# Patient Record
Sex: MALE | Race: WHITE | HISPANIC OR LATINO | URBAN - METROPOLITAN AREA
[De-identification: names, ages, dates, MRNs, and addresses within clinical notes are randomized per-mention and may not be internally consistent; named-entity substitution may affect disease eponyms.]

---

## 2018-01-08 ENCOUNTER — INPATIENT (INPATIENT)
Facility: HOSPITAL | Age: 73
LOS: 0 days | Discharge: HOME | DRG: 563 | End: 2018-01-09
Attending: HOSPITALIST | Admitting: HOSPITALIST
Payer: MEDICARE

## 2018-01-08 VITALS
HEIGHT: 69 IN | OXYGEN SATURATION: 96 % | TEMPERATURE: 98 F | HEART RATE: 84 BPM | WEIGHT: 199.96 LBS | RESPIRATION RATE: 20 BRPM | SYSTOLIC BLOOD PRESSURE: 152 MMHG | DIASTOLIC BLOOD PRESSURE: 87 MMHG

## 2018-01-08 DIAGNOSIS — S82.899A OTHER FRACTURE OF UNSPECIFIED LOWER LEG, INITIAL ENCOUNTER FOR CLOSED FRACTURE: ICD-10-CM

## 2018-01-08 LAB
ALBUMIN SERPL ELPH-MCNC: 4.1 G/DL — SIGNIFICANT CHANGE UP (ref 3.5–5)
ALP SERPL-CCNC: 60 U/L — SIGNIFICANT CHANGE UP (ref 40–120)
ALT FLD-CCNC: 72 U/L DA — HIGH (ref 10–60)
ANION GAP SERPL CALC-SCNC: 8 MMOL/L — SIGNIFICANT CHANGE UP (ref 5–17)
APTT BLD: 29.1 SEC — SIGNIFICANT CHANGE UP (ref 27.5–37.4)
AST SERPL-CCNC: 63 U/L — HIGH (ref 10–40)
BILIRUB SERPL-MCNC: 0.9 MG/DL — SIGNIFICANT CHANGE UP (ref 0.2–1.2)
BLD GP AB SCN SERPL QL: SIGNIFICANT CHANGE UP
BUN SERPL-MCNC: 13 MG/DL — SIGNIFICANT CHANGE UP (ref 7–18)
CALCIUM SERPL-MCNC: 9.1 MG/DL — SIGNIFICANT CHANGE UP (ref 8.4–10.5)
CHLORIDE SERPL-SCNC: 107 MMOL/L — SIGNIFICANT CHANGE UP (ref 96–108)
CO2 SERPL-SCNC: 29 MMOL/L — SIGNIFICANT CHANGE UP (ref 22–31)
CREAT SERPL-MCNC: 0.88 MG/DL — SIGNIFICANT CHANGE UP (ref 0.5–1.3)
GLUCOSE SERPL-MCNC: 103 MG/DL — HIGH (ref 70–99)
HCT VFR BLD CALC: 47 % — SIGNIFICANT CHANGE UP (ref 39–50)
HGB BLD-MCNC: 14.8 G/DL — SIGNIFICANT CHANGE UP (ref 13–17)
INR BLD: 1.13 RATIO — SIGNIFICANT CHANGE UP (ref 0.88–1.16)
MCHC RBC-ENTMCNC: 28.4 PG — SIGNIFICANT CHANGE UP (ref 27–34)
MCHC RBC-ENTMCNC: 31.5 GM/DL — LOW (ref 32–36)
MCV RBC AUTO: 90.2 FL — SIGNIFICANT CHANGE UP (ref 80–100)
PLATELET # BLD AUTO: 264 K/UL — SIGNIFICANT CHANGE UP (ref 150–400)
POTASSIUM SERPL-MCNC: 4.1 MMOL/L — SIGNIFICANT CHANGE UP (ref 3.5–5.3)
POTASSIUM SERPL-SCNC: 4.1 MMOL/L — SIGNIFICANT CHANGE UP (ref 3.5–5.3)
PROT SERPL-MCNC: 7.8 G/DL — SIGNIFICANT CHANGE UP (ref 6–8.3)
PROTHROM AB SERPL-ACNC: 12.4 SEC — SIGNIFICANT CHANGE UP (ref 9.8–12.7)
RBC # BLD: 5.21 M/UL — SIGNIFICANT CHANGE UP (ref 4.2–5.8)
RBC # FLD: 12.9 % — SIGNIFICANT CHANGE UP (ref 10.3–14.5)
SODIUM SERPL-SCNC: 144 MMOL/L — SIGNIFICANT CHANGE UP (ref 135–145)
WBC # BLD: 9.7 K/UL — SIGNIFICANT CHANGE UP (ref 3.8–10.5)
WBC # FLD AUTO: 9.7 K/UL — SIGNIFICANT CHANGE UP (ref 3.8–10.5)

## 2018-01-08 PROCEDURE — 99285 EMERGENCY DEPT VISIT HI MDM: CPT

## 2018-01-08 PROCEDURE — 73610 X-RAY EXAM OF ANKLE: CPT | Mod: 26,LT

## 2018-01-08 PROCEDURE — 73630 X-RAY EXAM OF FOOT: CPT | Mod: 26,LT

## 2018-01-08 PROCEDURE — 71045 X-RAY EXAM CHEST 1 VIEW: CPT | Mod: 26

## 2018-01-08 NOTE — ED PROVIDER NOTE - CHPI ED SYMPTOMS NEG
no fever, no chills, no shortness of breath, no cough, no chest pain, no palpitations, no nausea, no vomiting, no diarrhea, no abd pain, no dysuria, no urinary frequency, no hematuria, no urinary/bowel incontinence, no numbness, no tingling, no weakness, no LOC

## 2018-01-08 NOTE — ED PROVIDER NOTE - OBJECTIVE STATEMENT
73 y/o M pt with PMHx of HTN, presents to ED c/o left ankle pain s/p mechanical trip and fall 3 days ago while walking on [employer] [place]. Pt states she had x-ray done at [facility] which showed a fracture. Pt denies fever, chills, shortness of breath, cough, chest pain, palpitations, nausea, vomiting, diarrhea, abd pain, dysuria, urinary frequency, hematuria, numbness, tingling, weakness, LOC, head trauma, or any other complaints. NKDA.

## 2018-01-08 NOTE — CONSULT NOTE ADULT - SUBJECTIVE AND OBJECTIVE BOX
CC: Left ankle pain s/p fall 3 days ago.      HPI: 72y year old male presents to ED for treatment of his left ankle s/p fall 3 days ago. Pt states he previously went to a PMD who had taken X-rays earlier today and noted a fracture. Patient was unable to get a cast or crutches there and came to ED to seek further treatment. Patient states he is currently not in any pain, and denies taking any medication for the pain since his fall.   Patient did not elevate, ice, nor wrap his ankle since the fall. He also admits he has had great difficulty ambulating. Patient admits to  (-) Fevers, (-) Chills, (-) Nausea, (-) Vomiting, (-) Shortness of Breath    PMH: Hypothyroidism  PSH: None  Allergies: NKDA  Social: Past smoker for 15 years (smoked 1 pack of cigarettes/day), denies use of alcohol nor illicit drug use.      Labs:        WBC Trend      Chem            T(F): 98.3 (01-08-18 @ 16:11), Max: 98.3 (01-08-18 @ 16:11)  HR: 84 (01-08-18 @ 16:11) (84 - 84)  BP: 152/87 (01-08-18 @ 16:11) (152/87 - 152/87)  RR: 20 (01-08-18 @ 16:11) (20 - 20)  SpO2: 96% (01-08-18 @ 16:11) (96% - 96%)  Wt(kg): --    O:   Vascular: Dorsalis Pedis and Posterior Tibial pulses 2/4. Capillary refill time less than 3 seconds digits 1-5 bilateral.   Neuro: Protective sensation intact to the level of the digits bilateral.  Integument: Skin warm, dry and supple bilateral. No open lesions or interdigital macerations noted bilateral. No hyperkeratotic lesions noted  Area of CC: Pain on palpation at the left lateral malleoli, ATFL and CFL region, edema to the left ankle, and ecchymosis noted lateral foot. No open lesions noted. unable to tolerate ROM.           A:  Closed displaced fracture of the Left fibula      P: Chart reviewed and patient evaluated  Reviewed X-rays: shows displaced fibular fracture, final reading pending.  Applied Altamirano Compression to the area of the fracture.  Applied Posterior splint to Left lower extremity.   Patient advised to be Non-weightbearing to the Left at all times.  Patient to be taken to the OR for ORIF of left ankle tomorrow (1/9/18).  Please medically clear patient for surgery.  NPO placed for midnight.  Discussed with attending. Dr. De La Paz

## 2018-01-08 NOTE — ED PROVIDER NOTE - PROGRESS NOTE DETAILS
Pt ACP pt, admission to hospitalist service indicated. Pt already seen by podiatry, recommend admission for med clearance and surgery tomorrow. D/w hospitalist Dr. Estevez who is prefers orthopedic service eval, d/w Dr. Hammond who will eval pt for surgery tomorrow.

## 2018-01-09 ENCOUNTER — TRANSCRIPTION ENCOUNTER (OUTPATIENT)
Age: 73
End: 2018-01-09

## 2018-01-09 VITALS
RESPIRATION RATE: 18 BRPM | OXYGEN SATURATION: 95 % | TEMPERATURE: 98 F | SYSTOLIC BLOOD PRESSURE: 156 MMHG | HEART RATE: 76 BPM | DIASTOLIC BLOOD PRESSURE: 88 MMHG

## 2018-01-09 DIAGNOSIS — E03.9 HYPOTHYROIDISM, UNSPECIFIED: ICD-10-CM

## 2018-01-09 DIAGNOSIS — S82.899A OTHER FRACTURE OF UNSPECIFIED LOWER LEG, INITIAL ENCOUNTER FOR CLOSED FRACTURE: ICD-10-CM

## 2018-01-09 DIAGNOSIS — Z29.9 ENCOUNTER FOR PROPHYLACTIC MEASURES, UNSPECIFIED: ICD-10-CM

## 2018-01-09 DIAGNOSIS — E78.5 HYPERLIPIDEMIA, UNSPECIFIED: ICD-10-CM

## 2018-01-09 DIAGNOSIS — I10 ESSENTIAL (PRIMARY) HYPERTENSION: ICD-10-CM

## 2018-01-09 PROCEDURE — G0378: CPT

## 2018-01-09 PROCEDURE — 80053 COMPREHEN METABOLIC PANEL: CPT

## 2018-01-09 PROCEDURE — 71045 X-RAY EXAM CHEST 1 VIEW: CPT

## 2018-01-09 PROCEDURE — 73630 X-RAY EXAM OF FOOT: CPT

## 2018-01-09 PROCEDURE — 85027 COMPLETE CBC AUTOMATED: CPT

## 2018-01-09 PROCEDURE — 85610 PROTHROMBIN TIME: CPT

## 2018-01-09 PROCEDURE — 86850 RBC ANTIBODY SCREEN: CPT

## 2018-01-09 PROCEDURE — 73610 X-RAY EXAM OF ANKLE: CPT

## 2018-01-09 PROCEDURE — 86900 BLOOD TYPING SEROLOGIC ABO: CPT

## 2018-01-09 PROCEDURE — 97161 PT EVAL LOW COMPLEX 20 MIN: CPT

## 2018-01-09 PROCEDURE — 85730 THROMBOPLASTIN TIME PARTIAL: CPT

## 2018-01-09 PROCEDURE — 93005 ELECTROCARDIOGRAM TRACING: CPT

## 2018-01-09 PROCEDURE — 99285 EMERGENCY DEPT VISIT HI MDM: CPT | Mod: 25

## 2018-01-09 PROCEDURE — 86901 BLOOD TYPING SEROLOGIC RH(D): CPT

## 2018-01-09 RX ORDER — SIMVASTATIN 20 MG/1
20 TABLET, FILM COATED ORAL AT BEDTIME
Qty: 0 | Refills: 0 | Status: DISCONTINUED | OUTPATIENT
Start: 2018-01-09 | End: 2018-01-09

## 2018-01-09 RX ORDER — ENOXAPARIN SODIUM 100 MG/ML
40 INJECTION SUBCUTANEOUS DAILY
Qty: 0 | Refills: 0 | Status: DISCONTINUED | OUTPATIENT
Start: 2018-01-09 | End: 2018-01-09

## 2018-01-09 RX ORDER — AMLODIPINE BESYLATE 2.5 MG/1
1 TABLET ORAL
Qty: 0 | Refills: 0 | COMMUNITY
Start: 2018-01-09

## 2018-01-09 RX ORDER — AMLODIPINE BESYLATE 2.5 MG/1
5 TABLET ORAL DAILY
Qty: 0 | Refills: 0 | Status: DISCONTINUED | OUTPATIENT
Start: 2018-01-09 | End: 2018-01-09

## 2018-01-09 RX ORDER — LEVOTHYROXINE SODIUM 125 MCG
50 TABLET ORAL DAILY
Qty: 0 | Refills: 0 | Status: DISCONTINUED | OUTPATIENT
Start: 2018-01-09 | End: 2018-01-09

## 2018-01-09 RX ORDER — SIMVASTATIN 20 MG/1
20 TABLET, FILM COATED ORAL
Qty: 0 | Refills: 0 | COMMUNITY

## 2018-01-09 RX ORDER — LEVOTHYROXINE SODIUM 125 MCG
1 TABLET ORAL
Qty: 0 | Refills: 0 | COMMUNITY

## 2018-01-09 RX ADMIN — AMLODIPINE BESYLATE 5 MILLIGRAM(S): 2.5 TABLET ORAL at 11:55

## 2018-01-09 RX ADMIN — Medication 50 MICROGRAM(S): at 06:16

## 2018-01-09 NOTE — H&P ADULT - PROBLEM SELECTOR PLAN 1
L lateral malleolus fracture s/p mechanical slip and fall   - Podiatry recommends ORIF and requests medical clearance  - Pt reports no signs/symptoms of CHF or CAD; no recent cardiac w/u found, EKG isolated TWI and Q wave in Lead III  - Pt reports childhood asthma and 10 year pack Hx 15 years ago; no wheezing, does not use rescue inhaler  - MET 3-6 as patient ambulates often at brisk speed and takes care of his own ADLs  Podiatry Dr. Ruggiero L lateral malleolus fracture s/p mechanical slip and fall   - Podiatry recommends ORIF and requests medical clearance  - Pt reports no signs/symptoms of CHF or CAD; no recent cardiac w/u found, EKG isolated TWI and Q wave in Lead III  - Pt reports childhood asthma and 10 year pack Hx 15 years ago; no wheezing, does not use rescue inhaler  - Pt reports chronic cough x 4 mos well controlled w/ Promethazine  - MET 3-6 as patient ambulates often at brisk speed and takes care of his own ADLs  Podiatry Dr. Ruggiero

## 2018-01-09 NOTE — H&P ADULT - NSHPLABSRESULTS_GEN_ALL_CORE
72 M AAOx3 PMH Hypothyroidism, HTN (not taking medications), HLD (not compliant w/ Simvastatin 20 mg), and Childhood Asthma (rarely uses rescue inhaler) p/w L lateral malleolus fracture s/p mechanical slip and fall - admitted for medical optimization and likely surgical intervention CBC Full  -  ( 08 Jan 2018 20:17 )  WBC Count : 9.7 K/uL  Hemoglobin : 14.8 g/dL  Hematocrit : 47.0 %  Platelet Count - Automated : 264 K/uL  Mean Cell Volume : 90.2 fl  Mean Cell Hemoglobin : 28.4 pg  Mean Cell Hemoglobin Concentration : 31.5 gm/dL  Auto Neutrophil # : x  Auto Lymphocyte # : x  Auto Monocyte # : x  Auto Eosinophil # : x  Auto Basophil # : x  Auto Neutrophil % : x  Auto Lymphocyte % : x  Auto Monocyte % : x  Auto Eosinophil % : x  Auto Basophil % : x    01-08    144  |  107  |  13  ----------------------------<  103<H>  4.1   |  29  |  0.88    Ca    9.1      08 Jan 2018 20:17    TPro  7.8  /  Alb  4.1  /  TBili  0.9  /  DBili  x   /  AST  63<H>  /  ALT  72<H>  /  AlkPhos  60  01-08    PT/INR - ( 08 Jan 2018 20:17 )   PT: 12.4 sec;   INR: 1.13 ratio         PTT - ( 08 Jan 2018 20:17 )  PTT:29.1 sec    RADIOLOGY & ADDITIONAL STUDIES (The following images were personally reviewed):    Xray Ankle Complete 3 Views, Left (01.08.18 @ 18:35) >  FINDINGS:  Mildly displaced fracture of the lateral malleolus.  The tibial plafond and talar dome appear intact.  Questionable minimal widening of the lateral clear space.  There is mild soft tissue swelling.  TinyAchilles enthesophyte.    IMPRESSION:  Mildly displaced fracture of the lateral malleolus. Questionable minimal   widening of the lateral clear space.

## 2018-01-09 NOTE — DISCHARGE NOTE ADULT - CARE PROVIDERS DIRECT ADDRESSES
,DirectAddress_Unknown ,DirectAddress_Unknown,[email or web address],DirectAddress_Unknown ,[email or web address],DirectAddress_Unknown,[email or web address]

## 2018-01-09 NOTE — DISCHARGE NOTE ADULT - HOSPITAL COURSE
72 [place] M AAOx3 PMH Hypothyroidism, HTN (not taking medications), HLD (not compliant w/ Simvastatin 20 mg), and Childhood Asthma (rarely uses rescue inhaler) presented with L lateral malleolus fracture s/p mechanical slip and fall on 1/5/18. Pt was attempting to cross [facility] and stepped on what he believed was snow but slipped on ice. The police nearby helped him and and offered to call an ambulance but the patient felt fine and walked home. On day of admission the pain was not improving and patient presented to [facility] who informed him that he had a L lateral malleolus fracture and sent him to the ED. Pt denies any other history of falls or trauma, recent illness, weakness, dyspnea, or syncopal episodes. In the ED pt seen in NAD, vital signs stable, blood work including CBC/CMP/Coags all within normal limits, and imaging confirmed the fracture and EKG remarkable for isolated Q wave and TWI in Lead III. Pt seen by the podiatry Team Dr. Ruggiero who recommended ORIF - Pt admitted for management of L lateral malleolus fracture 72 [place] M AAOx3 PMH Hypothyroidism, HTN (not taking medications), HLD (not compliant w/ Simvastatin 20 mg), and Childhood Asthma (rarely uses rescue inhaler) presented with L lateral malleolus fracture s/p mechanical slip and fall on 1/5/18. Pt was attempting to cross [facility] and stepped on what he believed was snow but slipped on ice. The police nearby helped him and and offered to call an ambulance but the patient felt fine and walked home. On day of admission the pain was not improving and patient presented to [facility] who informed him that he had a L lateral malleolus fracture and sent him to the ED. Pt denies any other history of falls or trauma, recent illness, weakness, dyspnea, or syncopal episodes. In the ED pt seen in NAD, vital signs stable, blood work including CBC/CMP/Coags all within normal limits, and imaging confirmed the fracture and EKG remarkable for isolated Q wave and TWI in Lead III. Pt seen by the podiatry Team Dr. Ruggiero who recommended ORIF - Pt admitted for management of L lateral malleolus fracture. Seen by orthopedics who recommended conservative management at this time. He was instructed to continue with splint, no weight bearing to LLE with crutches. They discussed that surgical intervention may be needed in the near future if the fracture displaces. He was instructed to follow up with Dr. Hammond in one week for a new XR. Patient was also seen by PT who recommended home PT and instructed him on using rolling walker which was provided to him.

## 2018-01-09 NOTE — DISCHARGE NOTE ADULT - CARE PROVIDER_API CALL
evan atwood  Phone: [phone]  Fax: (       - evan atwood  Phone: [phone]  Fax: (   )    -    Moises Hammond), Orthopaedic Surgery; Sports Medicine  [address] [place] [address]  Phone: [phone]  Fax: [phone]    Emilia Estevez), Geriatric Medicine; Internal Medicine  [address] [place] [address]  Phone: [phone]  Fax: [phone] Moises Hammond (MD), Orthopaedic Surgery; Sports Medicine  [address] [facility] [place] [address]  Phone: [phone]  Fax: [phone]    evan atwood  Phone: [phone]  Fax: (   )    -    Yobany De La Paz (DPDEMARCUS), Foot and Ankle Surgery; Podiatric Medicine and Surgery; [facility]  [address] [place] [address]  Phone: [phone]  Fax: [phone]

## 2018-01-09 NOTE — DISCHARGE NOTE ADULT - PATIENT PORTAL LINK FT
“You can access the Follow[facility] Patient Portal, offered by [facility], by registering with the following website: [email or web address]”

## 2018-01-09 NOTE — DISCHARGE NOTE ADULT - CARE PLAN
Principal Discharge DX:	Ankle fracture  Goal:	patient will be symptom free  Instructions for follow-up, activity and diet:	Your x-ray showed     Mildly displaced fracture of the lateral malleolus.  Secondary Diagnosis:	HLD (hyperlipidemia)  Instructions for follow-up, activity and diet:	continue on home statin  continue TLC diet  Secondary Diagnosis:	HTN (hypertension)  Instructions for follow-up, activity and diet:	continue on Norvasc and follow up with your PCP within 1 week for possible dose adjustment  continue DASH diet  Secondary Diagnosis:	Hypothyroid  Instructions for follow-up, activity and diet:	continue on home medications. Please follow up with you endocrinologist as an outpatient. Principal Discharge DX:	Ankle fracture  Goal:	patient will be symptom free  Instructions for follow-up, activity and diet:	Your x-ray showed a Mildly displaced fracture of the lateral malleolus. You were seen by orthopedics Stephany who reccomends conservative management at this time. Continue with splint, no weight bearing to LLE with crutches. They discussed with you that surgical intervention may be needed in the near future if the fracture displaces. Please follow up with Dr. Hammond in one week for a new XR.  Secondary Diagnosis:	HLD (hyperlipidemia)  Instructions for follow-up, activity and diet:	continue on home statin  continue TLC diet  Secondary Diagnosis:	HTN (hypertension)  Instructions for follow-up, activity and diet:	continue on Norvasc and follow up with your PCP within 1 week for possible dose adjustment  continue DASH diet  Secondary Diagnosis:	Hypothyroid  Instructions for follow-up, activity and diet:	continue on home medications. Please follow up with you endocrinologist as an outpatient. Principal Discharge DX:	Ankle fracture  Goal:	patient will be symptom free  Instructions for follow-up, activity and diet:	Your x-ray showed a Mildly displaced fracture of the lateral malleolus. You were seen by orthopedics Stephany who recommends conservative management at this time. Continue with splint, no weight bearing to LLE with crutches. They discussed with you that surgical intervention may be needed in the near future if the fracture displaces. Please follow up with Dr. Hammond in one week for a new XR.  Secondary Diagnosis:	HLD (hyperlipidemia)  Instructions for follow-up, activity and diet:	continue on home statin  continue TLC diet  Secondary Diagnosis:	HTN (hypertension)  Instructions for follow-up, activity and diet:	continue on Norvasc and follow up with your PCP within 1 week for possible dose adjustment  continue DASH diet  Secondary Diagnosis:	Hypothyroid  Instructions for follow-up, activity and diet:	continue on home medications. Please follow up with you endocrinologist as an outpatient.

## 2018-01-09 NOTE — H&P ADULT - ASSESSMENT
72 M AAOx3 PMH Hypothyroidism, HTN (not taking medications), HLD (not compliant w/ Simvastatin 20 mg), and Childhood Asthma (rarely uses rescue inhaler) p/w L lateral malleolus fracture s/p mechanical slip and fall 1/5/18. Pt was attempting to cross [facility] and stepped on what he believed was snow but slipped on ice. The police nearby helped him and and offered to call an ambulance but the patient felt fine and walked home. Today the pain was not improving and presented to [facility] who informed him that he had a L lateral malleolus fracture and sent him to the ED. Pt denies any other history of falls or trauma, recent illness, weakness, dyspnea, or syncopal episodes. In the ED pt seen in NAD, vital signs acceptable, blood work including CBC/CMP/Coags within normal limits, and imaging confirmed the fracture. Pt seen by the podiatry Team Dr. Ruggiero who recommended ORIF - Pt admitted for management of L lateral malleolus fracture 72 M AAOx3 PMH Hypothyroidism, HTN (not taking medications), HLD (not compliant w/ Simvastatin 20 mg), and Childhood Asthma (rarely uses rescue inhaler) p/w L lateral malleolus fracture s/p mechanical slip and fall - admitted for medical optimization and likely surgical intervention

## 2018-01-09 NOTE — H&P ADULT - PROBLEM SELECTOR PLAN 2
BP elevated in ED consistently x 3 readings  - Pt takes no medication at home  - Begin low dose Amlodipine w/ Parameters

## 2018-01-09 NOTE — DISCHARGE NOTE ADULT - MEDICATION SUMMARY - MEDICATIONS TO TAKE
I will START or STAY ON the medications listed below when I get home from the hospital:    rolling walker  -- Please provide patient with one rolling walker  -- Indication: For Other fracture of unspecified lower leg, initial encounter for closed fracture    simvastatin  -- 20 milligram(s) by mouth once a day (at bedtime)  -- Indication: For HLD (hyperlipidemia)    amLODIPine 5 mg oral tablet  -- 1 tab(s) by mouth once a day  -- Indication: For HTN (hypertension)    Synthroid 50 mcg (0.05 mg) oral tablet  -- 1 tab(s) by mouth once a day  -- Indication: For Hypothyroid

## 2018-01-09 NOTE — CHART NOTE - NSCHARTNOTEFT_GEN_A_CORE
MEDICAL ATTENDING NOTE    Patient is a 72y old  Male who presents with a chief complaint of fall -- mildly displaced fracture of lateral malleolus (09 Jan 2018 10:50)      INTERVAL HPI/OVERNIGHT EVENTS: no new complaints    MEDICATIONS  (STANDING):  amLODIPine   Tablet 5 milliGRAM(s) Oral daily  enoxaparin Injectable 40 milliGRAM(s) SubCutaneous daily  levothyroxine 50 MICROGram(s) Oral daily  simvastatin 20 milliGRAM(s) Oral at bedtime    MEDICATIONS  (PRN):      __________________________________________________  ----------------------------------------------------------------------------------  REVIEW OF SYSTEMS: no fever, no SOB, No Chest pain; feels well      Vital Signs Last 24 Hrs  T(C): 36.6 (09 Jan 2018 11:35), Max: 36.9 (09 Jan 2018 01:50)  T(F): 97.9 (09 Jan 2018 11:35), Max: 98.5 (09 Jan 2018 01:50)  HR: 76 (09 Jan 2018 11:35) (71 - 80)  BP: 156/88 (09 Jan 2018 11:35) (147/72 - 184/74)  BP(mean): --  RR: 18 (09 Jan 2018 11:35) (16 - 18)  SpO2: 95% (09 Jan 2018 11:35) (95% - 98%)    _________________  PHYSICAL EXAM:  ---------------------------   NAD; Normocephalic;   LUNGS - no wheezing  HEART: S1 S2+   ABDOMEN: Soft, Nontender, non distended  EXTREMITIES: no cyanosis; left LE splint+  NERVOUS SYSTEM:  Awake and alert; no new deficits    _________________________________________________  LABS:                        14.8   9.7   )-----------( 264      ( 08 Jan 2018 20:17 )             47.0     01-08    144  |  107  |  13  ----------------------------<  103<H>  4.1   |  29  |  0.88    Ca    9.1      08 Jan 2018 20:17    TPro  7.8  /  Alb  4.1  /  TBili  0.9  /  DBili  x   /  AST  63<H>  /  ALT  72<H>  /  AlkPhos  60  01-08    PT/INR - ( 08 Jan 2018 20:17 )   PT: 12.4 sec;   INR: 1.13 ratio         PTT - ( 08 Jan 2018 20:17 )  PTT:29.1 sec    CAPILLARY BLOOD GLUCOSE        Care Discussed with Consultants : orthopedist and podiatry attending    Plan of care was discussed with patient ; all questions and concerns were addressed and care was aligned with patient's wishes.  Patient has been advised to follow up with PMD upon discharge from the hospital.  Discharge plans discussed with nursing staff and ED hold NP.    ASSESSMENT AND PLAN:    Ankle fracture with minimal displacement:  Pain control;  seen by surgical team - no MEDICAL ATTENDING NOTE- PLEASE LINK RHALETHA NOTE TP THE ADMIT HISTORY AND PHYSICAL    Patient is a 72y old  Male who presents with a chief complaint of fall -- mildly displaced fracture of lateral malleolus (09 Jan 2018 10:50)    INTERVAL HPI/ OVERNIGHT EVENTS: no new complaints    MEDICATIONS  (STANDING):  amLODIPine   Tablet 5 milliGRAM(s) Oral daily  enoxaparin Injectable 40 milliGRAM(s) SubCutaneous daily  levothyroxine 50 MICROGram(s) Oral daily  simvastatin 20 milliGRAM(s) Oral at bedtime    MEDICATIONS  (PRN):      __________________________________________________  ----------------------------------------------------------------------------------  REVIEW OF SYSTEMS: no fever, no SOB, No Chest pain; feels well; other systems reviewed are negative    Vital Signs Last 24 Hrs  T(C): 36.6 (09 Jan 2018 11:35), Max: 36.9 (09 Jan 2018 01:50)  T(F): 97.9 (09 Jan 2018 11:35), Max: 98.5 (09 Jan 2018 01:50)  HR: 76 (09 Jan 2018 11:35) (71 - 80)  BP: 156/88 (09 Jan 2018 11:35) (147/72 - 184/74)  BP(mean): --  RR: 18 (09 Jan 2018 11:35) (16 - 18)  SpO2: 95% (09 Jan 2018 11:35) (95% - 98%)    _________________  PHYSICAL EXAM:  ---------------------------   NAD; Normocephalic;   LUNGS - no wheezing  HEART: S1 S2+   ABDOMEN: Soft, Nontender, non distended  EXTREMITIES: no cyanosis; left LE splint+  NERVOUS SYSTEM:  Awake and alert; no new deficits    _________________________________________________  LABS:                        14.8   9.7   )-----------( 264      ( 08 Jan 2018 20:17 )             47.0     01-08    144  |  107  |  13  ----------------------------<  103<H>  4.1   |  29  |  0.88    Ca    9.1      08 Jan 2018 20:17    TPro  7.8  /  Alb  4.1  /  TBili  0.9  /  DBili  x   /  AST  63<H>  /  ALT  72<H>  /  AlkPhos  60  01-08    PT/INR - ( 08 Jan 2018 20:17 )   PT: 12.4 sec;   INR: 1.13 ratio         PTT - ( 08 Jan 2018 20:17 )  PTT:29.1 sec    CAPILLARY BLOOD GLUCOSE  RADIOLOGY REVIEW:  < from: Xray Ankle Complete 3 Views, Left (01.08.18 @ 18:35) >    FINDINGS:  Mildly displaced fracture of the lateral malleolus.  The tibial plafond and talar dome appear intact.  Questionable minimal widening of the lateral clear space.  There is mild soft tissue swelling.  TinyAchilles enthesophyte.    IMPRESSION:  Mildly displaced fracture of the lateral malleolus. Questionable minimal   widening of the lateral clear space.    < end of copied text >    Care Discussed with Consultants : orthopedist and podiatry attending    ASSESSMENT AND PLAN:  Mildly displaced fracture of the lateral malleolus ( ankle fracture) : seen by ortho and podiatry surgical teams. No plan for surgical intervention at this time due to very minimal displacement and also significant edema of the affected limb. Non weight bearing with ankle stabilization x 1week; repeat imaging + ortho follow up in one week advised.   I discussed with patient in details. all his questions were addressed. I discussed with both ortho and podiatry team attendings multiple times about case.  Patient discharged in stable condition with plan for follow up next week.  Total time spent 70minutes  Care Discussed with Consultants : orthopedist and podiatry attending    Plan of care was discussed with patient ; all questions and concerns were addressed and care was aligned with patient's wishes.  Patient has been advised to follow up with PMD upon discharge from the hospital.  Discharge plans discussed with nursing staff and ED hold NP.

## 2018-01-09 NOTE — ED ADULT NURSE REASSESSMENT NOTE - NS ED NURSE REASSESS COMMENT FT1
Patient remains hemodynamically stable and in no acute distress, able to speak in full sentences and breathe comfortably in room air. Patient refused to keep IV access in place and it was actually removed bty JARRELL Sanders. Patient understood that eventually a peripheral IV access must be inserted again.

## 2018-01-09 NOTE — CONSULT NOTE ADULT - PROBLEM SELECTOR RECOMMENDATION 9
7 Seen w/- Recommends conservative management- Continue with splint, NWB to LLE with crutches, discussed with patient that surgical intervention may be needed in the near future if fracture displaces- pt is to follow up with  in one week for new XR.

## 2018-01-09 NOTE — H&P ADULT - ATTENDING COMMENTS
Patient was seen and examined by myself. Case was discussed with house staff in details. I have reviewed and agree with the plan as outlined above with edits where appropriate.  See attending chart note

## 2018-01-09 NOTE — CONSULT NOTE ADULT - ATTENDING COMMENTS
distal fibula fracture with min displacement, no medial space wideneing or medial tenderness.      rec fracture care. nwb crutches, ice/ elevation  f/u in office 1 wk for xray    agree with above

## 2018-01-09 NOTE — H&P ADULT - MUSCULOSKELETAL COMMENTS
LLE examination limited 2/2 cast in place, movement and sensation intact in toes w/ no overt sign of ischemia

## 2018-01-09 NOTE — PHYSICAL THERAPY INITIAL EVALUATION ADULT - GENERAL OBSERVATIONS, REHAB EVAL
Consult received, chart reviewed. Patient received supine in bed, NAD, +Lt. lower leg splint. Patient agreed to EVALUATION from Physical Therapist.

## 2018-01-09 NOTE — PHYSICAL THERAPY INITIAL EVALUATION ADULT - CRITERIA FOR SKILLED THERAPEUTIC INTERVENTIONS
functional limitations in following categories/risk reduction/prevention/impairments found/therapy frequency/anticipated discharge recommendation/rehab potential/predicted duration of therapy intervention/anticipated equipment needs at discharge

## 2018-01-09 NOTE — DISCHARGE NOTE ADULT - PROVIDER TOKENS
FREE:[LAST:[angelic],FIRST:[evan],PHONE:[phone]],FAX:[(   )    -]] FREE:[LAST:[angelic],FIRST:[evan],PHONE:[phone]],FAX:[(   )    -]],TOKEN:'[id number]',TOKEN:'[id number]' TOKEN:'[id number]',FREE:[LAST:[angelic],FIRST:[evan],PHONE:[phone]],FAX:[(   )    -]],TOKEN:'[id number]'

## 2018-01-09 NOTE — CONSULT NOTE ADULT - SUBJECTIVE AND OBJECTIVE BOX
71 y/o M presents to the ED with a complaint of left ankle pain s/p slip and fall outside on the ice on 1/5/18. Pt then went to [facility] physicians a few days later due to worsening pain and a lateral mal fx was found- he then presented to the ED. Pt ambulates independtly. Denies any numbness/tingling/CP/SOB. Pain to the left ankle controlled.    PMhx: HTN, Hypothyroidism, childhood asthma, HLD    Physical Exam:    Vital Signs Last 24 Hrs  T(C): 36.4 (09 Jan 2018 08:19), Max: 36.9 (09 Jan 2018 01:50)  T(F): 97.6 (09 Jan 2018 08:19), Max: 98.5 (09 Jan 2018 01:50)  HR: 74 (09 Jan 2018 08:19) (71 - 84)  BP: 152/86 (09 Jan 2018 08:19) (148/79 - 155/73)  BP(mean): --  RR: 17 (09 Jan 2018 08:19) (16 - 20)  SpO2: 98% (09 Jan 2018 08:19) (96% - 98%)    Gen: NAD, A&o x 3, laying in bed comfortably  LLE: In Posterior splint, splint c/d/i, moves all toes, SILT to toes, Mild TTP over lateral mal, no TTP to medial aspect of ankle or proximal leg/knee. Painless knee ROM, BCR.             LABS:                        14.8   9.7   )-----------( 264      ( 08 Jan 2018 20:17 )             47.0     01-08    144  |  107  |  13  ----------------------------<  103<H>  4.1   |  29  |  0.88    Ca    9.1      08 Jan 2018 20:17    TPro  7.8  /  Alb  4.1  /  TBili  0.9  /  DBili  x   /  AST  63<H>  /  ALT  72<H>  /  AlkPhos  60  01-08    PT/INR - ( 08 Jan 2018 20:17 )   PT: 12.4 sec;   INR: 1.13 ratio         PTT - ( 08 Jan 2018 20:17 )  PTT:29.1 sec      RADIOLOGY & ADDITIONAL STUDIES: L ankle XR: Mildly displaced fracture of the lateral malleolus. Questionable minimal   widening of the lateral clear space.    A/P :  71 y/o M w/ L ankle lateral mal fx  - Seen w/- Recommends conservative management- Continue with splint, NWB to LLE with crutches, discussed with patient that surgical intervention may be needed in the near future if fracture displaces- pt is to follow up with  in one week for new XR.   - Pain control  - Keep splint clean and dry  - PT- Gait training- NWB to LLE with crutches  - Elevate/Ice to LLE  - Follow-up with Dr. Hammond in ONE WEEK at [phone]

## 2018-01-09 NOTE — H&P ADULT - NSHPSOCIALHISTORY_GEN_ALL_CORE
not , lives alone at home, retired , remote Hx of tobacco use x 10 years 15 years ago, nondrinker, no illicit drug use

## 2018-01-09 NOTE — H&P ADULT - HISTORY OF PRESENT ILLNESS
72 M AAOx3 PMH Hypothyroidism, HTN (not taking medications), HLD (not compliant w/ Simvastatin 20 mg), and Childhood Asthma (rarely uses rescue inhaler) p/w L lateral malleolus fracture s/p mechanical slip and fall 1/5/18. Pt was attempting to cross [facility] and stepped on what he believed was snow but slipped on ice. The police nearby helped him and and offered to call an ambulance but the patient felt fine and walked home. Today the pain was not improving and presented to [facility] who informed him that he had a L lateral malleolus fracture and sent him to the ED. Pt denies any other history of falls or trauma, recent illness, weakness, dyspnea, or syncopal episodes. In the ED pt seen in NAD, vital signs acceptable, blood work including CBC/CMP/Coags within normal limits, and imaging confirmed the fracture. Pt seen by the podiatry Team Dr. Ruggiero who recommended ORIF - Pt admitted for management of L lateral malleolus fracture 72 M AAOx3 PMH Hypothyroidism, HTN (not taking medications), HLD (not compliant w/ Simvastatin 20 mg), and Childhood Asthma (rarely uses rescue inhaler) p/w L lateral malleolus fracture s/p mechanical slip and fall 1/5/18. Pt was attempting to cross [facility] and stepped on what he believed was snow but slipped on ice. The police nearby helped him and and offered to call an ambulance but the patient felt fine and walked home. Today the pain was not improving and presented to [facility] who informed him that he had a L lateral malleolus fracture and sent him to the ED. Pt denies any other history of falls or trauma, recent illness, weakness, dyspnea, or syncopal episodes. In the ED pt seen in NAD, vital signs acceptable, blood work including CBC/CMP/Coags within normal limits, and imaging confirmed the fracture and EKG remarkable for isolated Q wave and TWI in Lead III. Pt seen by the podiatry Team Dr. Ruggiero who recommended ORIF - Pt admitted for management of L lateral malleolus fracture 72 [place] M AAOx3 PMH Hypothyroidism, HTN (not taking medications), HLD (not compliant w/ Simvastatin 20 mg), and Childhood Asthma (rarely uses rescue inhaler) p/w L lateral malleolus fracture s/p mechanical slip and fall 1/5/18. Pt was attempting to cross [facility] and stepped on what he believed was snow but slipped on ice. The police nearby helped him and and offered to call an ambulance but the patient felt fine and walked home. Today the pain was not improving and presented to [facility] who informed him that he had a L lateral malleolus fracture and sent him to the ED. Pt denies any other history of falls or trauma, recent illness, weakness, dyspnea, or syncopal episodes. In the ED pt seen in NAD, vital signs acceptable, blood work including CBC/CMP/Coags within normal limits, and imaging confirmed the fracture and EKG remarkable for isolated Q wave and TWI in Lead III. Pt seen by the podiatry Team Dr. Ruggiero who recommended ORIF - Pt admitted for management of L lateral malleolus fracture

## 2018-01-09 NOTE — DISCHARGE NOTE ADULT - PLAN OF CARE
patient will be symptom free Your x-ray showed     Mildly displaced fracture of the lateral malleolus. continue on home statin  continue TLC diet continue on Norvasc and follow up with your PCP within 1 week for possible dose adjustment  continue DASH diet continue on home medications. Please follow up with you endocrinologist as an outpatient. Your x-ray showed a Mildly displaced fracture of the lateral malleolus. You were seen by orthopedics Stephany who reccomends conservative management at this time. Continue with splint, no weight bearing to LLE with crutches. They discussed with you that surgical intervention may be needed in the near future if the fracture displaces. Please follow up with Dr. Hammond in one week for a new XR. Your x-ray showed a Mildly displaced fracture of the lateral malleolus. You were seen by orthopedics Stephany who recommends conservative management at this time. Continue with splint, no weight bearing to LLE with crutches. They discussed with you that surgical intervention may be needed in the near future if the fracture displaces. Please follow up with Dr. Hammond in one week for a new XR.

## 2018-01-09 NOTE — PHYSICAL THERAPY INITIAL EVALUATION ADULT - MANUAL MUSCLE TESTING RESULTS, REHAB EVAL
except lt. ankle not assessed as splinted, Lt. knee at least 3/5 however, no pressure applied secondary to splint/fx/no strength deficits were identified

## 2018-01-09 NOTE — H&P ADULT - NSHPPHYSICALEXAM_GEN_ALL_CORE
T(C): 36.9 (09 Jan 2018 01:50), Max: 36.9 (09 Jan 2018 01:50)  T(F): 98.5 (09 Jan 2018 01:50), Max: 98.5 (09 Jan 2018 01:50)  HR: 80 (09 Jan 2018 01:50) (80 - 84)  BP: 155/73 (09 Jan 2018 01:50) (152/87 - 155/73)  RR: 16 (09 Jan 2018 01:50) (16 - 20)  SpO2: 96% (09 Jan 2018 01:50) (96% - 96%)

## 2019-06-13 PROBLEM — I10 ESSENTIAL (PRIMARY) HYPERTENSION: Chronic | Status: ACTIVE | Noted: 2018-01-08

## 2019-06-13 PROBLEM — E03.9 HYPOTHYROIDISM, UNSPECIFIED: Chronic | Status: ACTIVE | Noted: 2018-01-09

## 2019-06-13 PROBLEM — E78.5 HYPERLIPIDEMIA, UNSPECIFIED: Chronic | Status: ACTIVE | Noted: 2018-01-09

## 2019-06-18 PROBLEM — Z00.00 ENCOUNTER FOR PREVENTIVE HEALTH EXAMINATION: Status: ACTIVE | Noted: 2019-06-18

## 2019-06-26 ENCOUNTER — APPOINTMENT (OUTPATIENT)
Dept: ORTHOPEDIC SURGERY | Facility: CLINIC | Age: 74
End: 2019-06-26
Payer: MEDICARE

## 2019-06-26 VITALS
DIASTOLIC BLOOD PRESSURE: 89 MMHG | BODY MASS INDEX: 29.62 KG/M2 | HEIGHT: 69 IN | SYSTOLIC BLOOD PRESSURE: 152 MMHG | HEART RATE: 75 BPM | WEIGHT: 200 LBS

## 2019-06-26 DIAGNOSIS — Z87.891 PERSONAL HISTORY OF NICOTINE DEPENDENCE: ICD-10-CM

## 2019-06-26 DIAGNOSIS — M19.071 PRIMARY OSTEOARTHRITIS, RIGHT ANKLE AND FOOT: ICD-10-CM

## 2019-06-26 PROCEDURE — 73610 X-RAY EXAM OF ANKLE: CPT | Mod: RT

## 2019-06-26 PROCEDURE — 99204 OFFICE O/P NEW MOD 45 MIN: CPT

## 2019-06-26 RX ORDER — DICLOFENAC SODIUM 16.05 MG/ML
1.5 SOLUTION TOPICAL
Qty: 1 | Refills: 0 | Status: ACTIVE | COMMUNITY
Start: 2019-06-26 | End: 1900-01-01

## 2019-06-26 NOTE — DISCUSSION/SUMMARY
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 1-2 pumps twice daily and apply to area with pain. There is low systemic absorption of the medication but risks while reduced remain were discussed and include but not limited to renal damage and GI ulceration and bleeding. They were warned to stop the medication if worsening buring skin or gastric pain or dizziness or other side effects. Also to immediately stop the medication and seek appropriate medical attention if any severe stomach ache, gastritis, black/red vomit, black/red stools or any other medical concern.\par \par \par Physical therapy\par \par Patient declined a corticosteroid injection\par \par Followup p.r.n.

## 2019-06-26 NOTE — HISTORY OF PRESENT ILLNESS
[de-identified] : CC right ankle\par \par HPI 72 yo male presents with acute and chronic onset of 3 months of activity related pain in the anterior lateral right ankle related to a slip on ice. The pain is same, and rated a 3 out of 10, described as aching, [without radiation]. Rest makes the pain better and prolonged walking makes the pain worse. The patient reports associated symptoms of -. The patient - similar pain previously . \par \par The patient has tried the following treatments:\par Activity modification	+\par Ice/Compression  	+\par Braces    		-\par Nsaids    		+\par Physical Therapy  	-\par Cortisone Injection	-\par Arthroscopy		-\par \par \par Review of Systems is positive for the above musculoskeletal symptoms and is otherwise non-contributory for general, constitutional, psychiatric, neurologic, HEENT, cardiac, respiratory, gastrointestinal, reproductive, lymphatic, and dermatologic complaints.\par \par Consult by Dr Codie Dee\par

## 2019-06-26 NOTE — PHYSICAL EXAM
[de-identified] : Physical Examination\par General: well nourished, in no acute distress, alert and oriented to person, place and time\par Psychiatric: normal mood and affect, no abnormal movements or speech patterns\par Eyes: vision intact - glasses\par Throat: no thyromegaly\par Lymph: no enlarged nodes, no lymphedema in extremity\par Respiratory: no wheezing, no shortness of breath with ambulation\par Cardiac: no cardiac leg swelling, 2+ peripheral pulses\par Neurology: normal gross sensation in extremities to light touch\par Abdomen: soft, non-tender, tympanic, no masses\par \par Musculoskeletal Examination\par Ambulation	- antalgic gait, - assistive devices\par \par Ankle			Right			Left\par General\par 	Deformity       	normal			normal	\par 	Skin/Edema   	normal			normal\par 	Erythema       	-			-\par 	Alignment      	neutral			neutral\par Range of Motion\par 	Ankle Dorsiflex	20			20\par 	Ankle Plantarflex	45			45\par 	Inversion        	15			15\par 	Eversion		5			5\par Drawer\par 	ATFL		-			-\par 	CFL	                	-			-\par 	PTFL		-			-\par Palpation\par 	ATFL		-			-\par 	Medial Heel   	-			-\par 	Other		lateral gutter			-\par Strength Examination/Atrophy\par 	EHL 		5+			5+\par 	FHL 		5+			5+\par 	Tibialis Anterior	5+			5+\par 	Achilles/Soleus	5+			5+\par Sensation\par 	Deep Peroneal	normal			normal\par 	Super Peroneal 	normal			normal\par 	Sural 		normal			normal\par 	Posterior Tibial 	normal			normal\par 	Saphenous    	normal			normal\par Pulses\par 	DP   		2+			2+\par \par \par  [de-identified] : 3 views of the affected right ankle, (AP, Oblique and Lateral)\par were ordered, obtained and evaluated by myself today and\par demonstrate:\par [Is] intact Mortise\par [Normal] Talus contour\par + Tibiotalar osteophtes small loose bodies within the lateral gutter\par - Os Trigonum\par - spurring\par [No] soft tissue calcification

## 2024-11-20 NOTE — H&P ADULT - PROBLEM SELECTOR PROBLEM 3
Chief Complaint   Patient presents with    Injury     Stumbled and fell on grass, injured sternum and clavicle area, bruising to neck, difficulty breathing x5-6 days      Patient escorted to exam room with wife.  Patient in Room 11.  Patient's name,  and allergies verified.   HLD (hyperlipidemia)